# Patient Record
Sex: MALE | URBAN - NONMETROPOLITAN AREA
[De-identification: names, ages, dates, MRNs, and addresses within clinical notes are randomized per-mention and may not be internally consistent; named-entity substitution may affect disease eponyms.]

---

## 2023-02-24 ENCOUNTER — APPOINTMENT (OUTPATIENT)
Dept: URBAN - NONMETROPOLITAN AREA CLINIC 54 | Age: 58
Setting detail: DERMATOLOGY
End: 2023-02-24

## 2023-02-24 DIAGNOSIS — L50.1 IDIOPATHIC URTICARIA: ICD-10-CM

## 2023-02-24 PROCEDURE — OTHER PRESCRIPTION: OTHER

## 2023-02-24 PROCEDURE — OTHER MIPS QUALITY: OTHER

## 2023-02-24 PROCEDURE — OTHER ADDITIONAL NOTES: OTHER

## 2023-02-24 PROCEDURE — OTHER PRESCRIPTION MEDICATION MANAGEMENT: OTHER

## 2023-02-24 PROCEDURE — OTHER COUNSELING: OTHER

## 2023-02-24 PROCEDURE — 99203 OFFICE O/P NEW LOW 30 MIN: CPT

## 2023-02-24 RX ORDER — FEXOFENADINE HYDROCHLORIDE 180 MG/1
TABLET ORAL
Qty: 180 | Refills: 1 | Status: ERX | COMMUNITY
Start: 2023-02-24

## 2023-02-24 ASSESSMENT — LOCATION DETAILED DESCRIPTION DERM
LOCATION DETAILED: LEFT DISTAL POSTERIOR THIGH
LOCATION DETAILED: RIGHT DISTAL POSTERIOR THIGH
LOCATION DETAILED: RIGHT VENTRAL DISTAL FOREARM

## 2023-02-24 ASSESSMENT — LOCATION SIMPLE DESCRIPTION DERM
LOCATION SIMPLE: RIGHT POSTERIOR THIGH
LOCATION SIMPLE: LEFT POSTERIOR THIGH
LOCATION SIMPLE: RIGHT FOREARM

## 2023-02-24 ASSESSMENT — LOCATION ZONE DERM
LOCATION ZONE: ARM
LOCATION ZONE: LEG

## 2023-02-24 NOTE — PROCEDURE: ADDITIONAL NOTES
Detail Level: Detailed
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 He now experiences sporadic flares that he cannot correlate to any particular exposures. He does travel for his job often. Will attempt to control with oral antihistamines.
Render Risk Assessment In Note?: no
none

## 2023-02-24 NOTE — PROCEDURE: PRESCRIPTION MEDICATION MANAGEMENT
Discontinue Regimen: Zyrtec PRN flares
Render In Strict Bullet Format?: No
Plan: Pt is to take 2 tabs BID x 2 weeks. If stable, he can begin to lower dose as tolerated.
Initiate Treatment: fexofenadine 180 mg tablet \\nSig: Take 2 tabs QAM and 2 tabs QPM
Detail Level: Zone
verbal cues/1 person assist

## 2023-04-07 ENCOUNTER — APPOINTMENT (OUTPATIENT)
Dept: URBAN - NONMETROPOLITAN AREA CLINIC 54 | Age: 58
Setting detail: DERMATOLOGY
End: 2023-04-07

## 2023-04-07 DIAGNOSIS — L50.1 IDIOPATHIC URTICARIA: ICD-10-CM

## 2023-04-07 PROCEDURE — OTHER PRESCRIPTION MEDICATION MANAGEMENT: OTHER

## 2023-04-07 PROCEDURE — 99213 OFFICE O/P EST LOW 20 MIN: CPT

## 2023-04-07 PROCEDURE — OTHER PRESCRIPTION: OTHER

## 2023-04-07 PROCEDURE — OTHER MIPS QUALITY: OTHER

## 2023-04-07 PROCEDURE — OTHER ADDITIONAL NOTES: OTHER

## 2023-04-07 RX ORDER — SALICYLIC ACID 3 G/100G
LOTION TOPICAL
Qty: 180 | Refills: 1 | Status: ERX | COMMUNITY
Start: 2023-04-07

## 2023-04-07 ASSESSMENT — LOCATION SIMPLE DESCRIPTION DERM
LOCATION SIMPLE: LEFT POSTERIOR THIGH
LOCATION SIMPLE: RIGHT POSTERIOR THIGH
LOCATION SIMPLE: RIGHT FOREARM

## 2023-04-07 ASSESSMENT — LOCATION ZONE DERM
LOCATION ZONE: ARM
LOCATION ZONE: LEG

## 2023-04-07 NOTE — PROCEDURE: PRESCRIPTION MEDICATION MANAGEMENT
Detail Level: Zone
Continue Regimen: Fexofenadine 180 mg tab \\nTake 2 tabs QAM and 2 tabs QPM \\nIf stable, he can begin to lower dose as tolerated (alternating 1 tab QAM and 1 tab QPM)
Render In Strict Bullet Format?: No
Continue Regimen: Allegra Allergy 180 mg tablet \\nQuantity: 180.0 Tablet\\nSig: Take one tab PO BID

## 2023-04-07 NOTE — PROCEDURE: ADDITIONAL NOTES
Additional Notes: He has noticed a significant improvement with fexofenadine. He reported occasional breakthrough flares initially but states that these have largely subsided. Will attempt to taper to fexofenadine 2 tabs QAM, 1 tab QHS.
Detail Level: Simple
Render Risk Assessment In Note?: no

## 2023-04-07 NOTE — HPI: FOLLOW-UP
What Is The Condition That You Are Returning For Follow-Up?: rash
Additional History: Still taking 2 tabs QAM and QPM, patient states medication is helping and only a few flare up from time to time.
When Was Your Last Appointment?: 2/24/23
During The Previous Visit, The Patient....: Patient was diagnosed with Urticaria and prescribed Fexofenadine 180mg tab

## 2023-07-11 ENCOUNTER — APPOINTMENT (OUTPATIENT)
Dept: URBAN - NONMETROPOLITAN AREA CLINIC 54 | Age: 58
Setting detail: DERMATOLOGY
End: 2023-07-12

## 2023-07-11 ENCOUNTER — RX ONLY (RX ONLY)
Age: 58
End: 2023-07-11

## 2023-07-11 DIAGNOSIS — L50.8 OTHER URTICARIA: ICD-10-CM

## 2023-07-11 PROCEDURE — OTHER ADDITIONAL NOTES: OTHER

## 2023-07-11 PROCEDURE — OTHER COUNSELING: OTHER

## 2023-07-11 PROCEDURE — 99213 OFFICE O/P EST LOW 20 MIN: CPT

## 2023-07-11 PROCEDURE — OTHER PRESCRIPTION MEDICATION MANAGEMENT: OTHER

## 2023-07-11 PROCEDURE — OTHER PRESCRIPTION: OTHER

## 2023-07-11 RX ORDER — HYDROXYZINE HYDROCHLORIDE 10 MG/1
TABLET, FILM COATED ORAL
Qty: 30 | Refills: 2 | Status: ERX | COMMUNITY
Start: 2023-07-11

## 2023-07-11 RX ORDER — FEXOFENADINE HYDROCHLORIDE 180 MG/1
TABLET ORAL
Qty: 180 | Refills: 1 | Status: ERX

## 2023-07-11 ASSESSMENT — LOCATION SIMPLE DESCRIPTION DERM
LOCATION SIMPLE: RIGHT POSTERIOR THIGH
LOCATION SIMPLE: RIGHT FOREARM
LOCATION SIMPLE: LEFT POSTERIOR THIGH

## 2023-07-11 ASSESSMENT — LOCATION ZONE DERM
LOCATION ZONE: ARM
LOCATION ZONE: LEG

## 2023-07-11 ASSESSMENT — LOCATION DETAILED DESCRIPTION DERM
LOCATION DETAILED: RIGHT VENTRAL DISTAL FOREARM
LOCATION DETAILED: LEFT DISTAL POSTERIOR THIGH
LOCATION DETAILED: RIGHT DISTAL POSTERIOR THIGH

## 2023-07-11 NOTE — PROCEDURE: PRESCRIPTION MEDICATION MANAGEMENT
Detail Level: Zone
Continue Regimen: Fexofenadine 180 mg tab \\nTake 2 tabs QAM
Initiate Treatment: hydroxyzine HCl 10 mg tablet \\nQuantity: 30.0 Tablet\\nSig: Take 1 tablet by mouth daily at night.
Render In Strict Bullet Format?: No

## 2023-07-11 NOTE — PROCEDURE: ADDITIONAL NOTES
Additional Notes: Pt has tapered his  daily dosage of fexofenadine to 1 tablet BID  but is starting to notice increased flaring. Will switch to fexofenadine 2 tablets QAM and hydroxyzine QHS. Discussed risk for drowsiness with use. Briefly discussed Xolair - pamphlet provided with additional information. Will consider if failing to remain well-controlled with prescribed antihistamines.
Detail Level: Simple
Render Risk Assessment In Note?: no